# Patient Record
Sex: FEMALE | Race: BLACK OR AFRICAN AMERICAN | URBAN - METROPOLITAN AREA
[De-identification: names, ages, dates, MRNs, and addresses within clinical notes are randomized per-mention and may not be internally consistent; named-entity substitution may affect disease eponyms.]

---

## 2020-06-22 ENCOUNTER — OFFICE VISIT (OUTPATIENT)
Dept: URBAN - METROPOLITAN AREA CLINIC 84 | Facility: CLINIC | Age: 80
End: 2020-06-22

## 2020-09-18 ENCOUNTER — OFFICE VISIT (OUTPATIENT)
Dept: URBAN - METROPOLITAN AREA CLINIC 84 | Facility: CLINIC | Age: 80
End: 2020-09-18
Payer: MEDICARE

## 2020-09-18 DIAGNOSIS — R10.13 DYSPEPSIA: ICD-10-CM

## 2020-09-18 DIAGNOSIS — K59.01 CONSTIPATION: ICD-10-CM

## 2020-09-18 DIAGNOSIS — K64.8 INTERNAL HEMORRHOID: ICD-10-CM

## 2020-09-18 PROCEDURE — G8417 CALC BMI ABV UP PARAM F/U: HCPCS | Performed by: INTERNAL MEDICINE

## 2020-09-18 PROCEDURE — 99213 OFFICE O/P EST LOW 20 MIN: CPT | Performed by: INTERNAL MEDICINE

## 2020-09-18 PROCEDURE — G8427 DOCREV CUR MEDS BY ELIG CLIN: HCPCS | Performed by: INTERNAL MEDICINE

## 2020-09-18 PROCEDURE — G9903 PT SCRN TBCO ID AS NON USER: HCPCS | Performed by: INTERNAL MEDICINE

## 2020-09-18 RX ORDER — OMEPRAZOLE 40 MG/1
TAKE 1 CAPSULE BY ORAL ROUTE DAILY CAPSULE, DELAYED RELEASE PELLETS ORAL 1
Qty: 90 | Refills: 3 | Status: ACTIVE | COMMUNITY
Start: 2019-12-20 | End: 1900-01-01

## 2020-09-18 RX ORDER — ESOMEPRAZOLE MAGNESIUM 40 MG
TAKE 1 CAPSULE BY ORAL ROUTE QD CAPSULE,DELAYED RELEASE (ENTERIC COATED) ORAL 1
Qty: 30 | Refills: 6 | Status: ACTIVE | COMMUNITY
Start: 2014-11-26 | End: 1900-01-01

## 2020-09-18 RX ORDER — LACTULOSE 10 G/15ML
TAKE 30 MILLILITERS BY ORAL ROUTE 4 TIMES A DAY AS NEEDED SOLUTION ORAL
Qty: 1800 | Refills: 5 | Status: ACTIVE | COMMUNITY
Start: 2017-11-20 | End: 1900-01-01

## 2020-09-18 RX ORDER — ASPIRIN 81 MG/1
TABLET, COATED ORAL
Qty: 0 | Refills: 0 | Status: ACTIVE | COMMUNITY
Start: 1900-01-01 | End: 1900-01-01

## 2020-09-18 RX ORDER — HYDROCORTISONE ACETATE 25 MG/1
INSERT 1 SUPPOSITORY BY RECTAL ROUTE 2 TIMES A DAY AS NEEDED SUPPOSITORY RECTAL 2
Qty: 60 | Refills: 5 | Status: ACTIVE | COMMUNITY
Start: 2019-05-21 | End: 1900-01-01

## 2020-09-18 NOTE — HPI-TODAY'S VISIT:
Patient last seen 12/2019.  Overall she is doing well.  SHe has been having a pain between her breasts.  She thinks it is from a HH.  It gets worse when she eats. It occurs intermittently.  She thinks it's from her diet.  She is on Omeprazole daily.  She denies N/V/dysphagia.  She denies anroexia or weight loss.  She thinks might be related to when she does not chew her food well. She denies abdominal pain.  She has regular BM's.  She only uses the Miralax PRN.  She denies strain.  She has intermittent rectal bleeding.  She uses Prep H TX PRN and this improves

## 2020-12-01 ENCOUNTER — TELEPHONE ENCOUNTER (OUTPATIENT)
Dept: URBAN - METROPOLITAN AREA CLINIC 92 | Facility: CLINIC | Age: 80
End: 2020-12-01

## 2020-12-01 RX ORDER — HYDROCORTISONE ACETATE 25 MG/1
INSERT 1 SUPPOSITORY BY RECTAL ROUTE 2 TIMES A DAY AS NEEDED SUPPOSITORY RECTAL 2
Qty: 60
Start: 2019-05-21

## 2021-03-18 ENCOUNTER — OFFICE VISIT (OUTPATIENT)
Dept: URBAN - METROPOLITAN AREA CLINIC 84 | Facility: CLINIC | Age: 81
End: 2021-03-18

## 2021-03-18 RX ORDER — ASPIRIN 81 MG/1
TABLET, COATED ORAL
Qty: 0 | Refills: 0 | COMMUNITY
Start: 1900-01-01

## 2021-03-18 RX ORDER — HYDROCORTISONE ACETATE 25 MG/1
_INSERT 1 SUPPOSITORY BY RECTAL ROUTE 2 TIMES A DAY AS NEEDED SUPPOSITORY RECTAL 2
Qty: 60 | COMMUNITY
Start: 2019-05-21

## 2021-03-18 RX ORDER — LACTULOSE 10 G/15ML
TAKE 30 MILLILITERS BY ORAL ROUTE 4 TIMES A DAY AS NEEDED SOLUTION ORAL
Qty: 1800 | Refills: 5 | COMMUNITY
Start: 2017-11-20

## 2021-03-18 RX ORDER — ESOMEPRAZOLE MAGNESIUM 40 MG
TAKE 1 CAPSULE BY ORAL ROUTE QD CAPSULE,DELAYED RELEASE (ENTERIC COATED) ORAL 1
Qty: 30 | Refills: 6 | COMMUNITY
Start: 2014-11-26

## 2021-03-18 RX ORDER — OMEPRAZOLE 40 MG/1
TAKE 1 CAPSULE BY ORAL ROUTE DAILY CAPSULE, DELAYED RELEASE PELLETS ORAL 1
Qty: 90 | Refills: 3 | COMMUNITY
Start: 2019-12-20

## 2021-04-22 ENCOUNTER — WEB ENCOUNTER (OUTPATIENT)
Dept: URBAN - METROPOLITAN AREA CLINIC 84 | Facility: CLINIC | Age: 81
End: 2021-04-22

## 2021-04-22 ENCOUNTER — OFFICE VISIT (OUTPATIENT)
Dept: URBAN - METROPOLITAN AREA CLINIC 84 | Facility: CLINIC | Age: 81
End: 2021-04-22
Payer: MEDICARE

## 2021-04-22 VITALS
DIASTOLIC BLOOD PRESSURE: 72 MMHG | HEIGHT: 65 IN | SYSTOLIC BLOOD PRESSURE: 146 MMHG | WEIGHT: 293 LBS | TEMPERATURE: 97.5 F | HEART RATE: 69 BPM | BODY MASS INDEX: 48.82 KG/M2

## 2021-04-22 DIAGNOSIS — K64.8 INTERNAL HEMORRHOID: ICD-10-CM

## 2021-04-22 DIAGNOSIS — R10.13 DYSPEPSIA: ICD-10-CM

## 2021-04-22 DIAGNOSIS — K59.01 CONSTIPATION: ICD-10-CM

## 2021-04-22 PROCEDURE — 99213 OFFICE O/P EST LOW 20 MIN: CPT | Performed by: INTERNAL MEDICINE

## 2021-04-22 RX ORDER — HYDROCORTISONE ACETATE 25 MG/1
_INSERT 1 SUPPOSITORY BY RECTAL ROUTE 2 TIMES A DAY AS NEEDED SUPPOSITORY RECTAL 2
Qty: 60 | Status: ACTIVE | COMMUNITY
Start: 2019-05-21

## 2021-04-22 RX ORDER — LACTULOSE 10 G/15ML
TAKE 30 MILLILITERS BY ORAL ROUTE 4 TIMES A DAY AS NEEDED SOLUTION ORAL
Qty: 1800 | Refills: 5 | Status: DISCONTINUED | COMMUNITY
Start: 2017-11-20

## 2021-04-22 RX ORDER — ESOMEPRAZOLE MAGNESIUM 40 MG
TAKE 1 CAPSULE BY ORAL ROUTE QD CAPSULE,DELAYED RELEASE (ENTERIC COATED) ORAL 1
Qty: 30 | Refills: 6 | Status: ACTIVE | COMMUNITY
Start: 2014-11-26

## 2021-04-22 RX ORDER — ASPIRIN 81 MG/1
TABLET, COATED ORAL
Qty: 0 | Refills: 0 | Status: ACTIVE | COMMUNITY
Start: 1900-01-01

## 2021-04-22 RX ORDER — OMEPRAZOLE 40 MG/1
TAKE 1 CAPSULE BY ORAL ROUTE DAILY CAPSULE, DELAYED RELEASE PELLETS ORAL 1
Qty: 90 | Refills: 3 | Status: ACTIVE | COMMUNITY
Start: 2019-12-20

## 2021-04-22 NOTE — PHYSICAL EXAM CONSTITUTIONAL:
Serology (COVID-19) Notification    You have tested NEGATIVE for COVID-19 antibodies  Letter sent to Patient well developed, well nourished , in no acute distress , ambulating without difficulty , normal communication ability

## 2021-11-15 ENCOUNTER — TELEPHONE ENCOUNTER (OUTPATIENT)
Dept: URBAN - METROPOLITAN AREA CLINIC 84 | Facility: CLINIC | Age: 81
End: 2021-11-15

## 2021-11-15 RX ORDER — HYDROCORTISONE ACETATE 25 MG/1
_INSERT 1 SUPPOSITORY BY RECTAL ROUTE 2 TIMES A DAY AS NEEDED SUPPOSITORY RECTAL 2
Qty: 60 | Refills: 3
Start: 2019-05-21 | End: 2022-11-10

## 2021-12-23 ENCOUNTER — WEB ENCOUNTER (OUTPATIENT)
Dept: URBAN - METROPOLITAN AREA CLINIC 84 | Facility: CLINIC | Age: 81
End: 2021-12-23

## 2021-12-23 ENCOUNTER — OFFICE VISIT (OUTPATIENT)
Dept: URBAN - METROPOLITAN AREA CLINIC 84 | Facility: CLINIC | Age: 81
End: 2021-12-23
Payer: MEDICARE

## 2021-12-23 DIAGNOSIS — K59.01 CONSTIPATION: ICD-10-CM

## 2021-12-23 DIAGNOSIS — K64.8 INTERNAL HEMORRHOID: ICD-10-CM

## 2021-12-23 PROCEDURE — 99213 OFFICE O/P EST LOW 20 MIN: CPT | Performed by: INTERNAL MEDICINE

## 2021-12-23 RX ORDER — OMEPRAZOLE 40 MG/1
TAKE 1 CAPSULE BY ORAL ROUTE DAILY CAPSULE, DELAYED RELEASE PELLETS ORAL 1
Qty: 90 | Refills: 3 | Status: ACTIVE | COMMUNITY
Start: 2019-12-20

## 2021-12-23 RX ORDER — ESOMEPRAZOLE MAGNESIUM 40 MG
TAKE 1 CAPSULE BY ORAL ROUTE QD CAPSULE,DELAYED RELEASE (ENTERIC COATED) ORAL 1
Qty: 30 | Refills: 6 | Status: ACTIVE | COMMUNITY
Start: 2014-11-26

## 2021-12-23 RX ORDER — HYDROCORTISONE ACETATE 25 MG/1
_INSERT 1 SUPPOSITORY BY RECTAL ROUTE 2 TIMES A DAY AS NEEDED SUPPOSITORY RECTAL 2
Qty: 60 | Refills: 3 | Status: ACTIVE | COMMUNITY
Start: 2019-05-21 | End: 2022-11-10

## 2021-12-23 RX ORDER — ASPIRIN 81 MG/1
TABLET, COATED ORAL
Qty: 0 | Refills: 0 | Status: ACTIVE | COMMUNITY
Start: 1900-01-01

## 2021-12-23 NOTE — HPI-TODAY'S VISIT:
Patient last seeen 4/2021.  Her hmorrhoids were doing OK for a while but then she had some recurrent symptoms lately.  She has been having intermittent BRBPR.   She has been having a rectal pressure that decreases with a BM.  She uses Miralax about once every 2 weeks.  She has a daily BM.  She often has small pellet stools.  She sameer strain.  She has increased rrectal bleeding.  She denies rectal pain.  She sameer abdominak pain.  She does respond to Anusol Pr PRN. She denies anrroexia or weight loss.  She denies abdominal pain.  She denies UGI symptoms.

## 2022-02-11 ENCOUNTER — OFFICE VISIT (OUTPATIENT)
Dept: URBAN - METROPOLITAN AREA CLINIC 84 | Facility: CLINIC | Age: 82
End: 2022-02-11
Payer: MEDICARE

## 2022-02-11 DIAGNOSIS — K59.01 CONSTIPATION: ICD-10-CM

## 2022-02-11 DIAGNOSIS — K64.8 INTERNAL HEMORRHOID: ICD-10-CM

## 2022-02-11 PROCEDURE — 99213 OFFICE O/P EST LOW 20 MIN: CPT | Performed by: INTERNAL MEDICINE

## 2022-02-11 RX ORDER — HYDROCORTISONE ACETATE 25 MG/1
_INSERT 1 SUPPOSITORY BY RECTAL ROUTE 2 TIMES A DAY AS NEEDED SUPPOSITORY RECTAL 2
Qty: 60 | Refills: 3 | Status: ACTIVE | COMMUNITY
Start: 2019-05-21 | End: 2022-11-10

## 2022-02-11 RX ORDER — ESOMEPRAZOLE MAGNESIUM 40 MG
TAKE 1 CAPSULE BY ORAL ROUTE QD CAPSULE,DELAYED RELEASE (ENTERIC COATED) ORAL 1
Qty: 30 | Refills: 6 | Status: ACTIVE | COMMUNITY
Start: 2014-11-26

## 2022-02-11 RX ORDER — OMEPRAZOLE 40 MG/1
TAKE 1 CAPSULE BY ORAL ROUTE DAILY CAPSULE, DELAYED RELEASE PELLETS ORAL 1
Qty: 90 | Refills: 3 | Status: ACTIVE | COMMUNITY
Start: 2019-12-20

## 2022-02-11 RX ORDER — ASPIRIN 81 MG/1
TABLET, COATED ORAL
Qty: 0 | Refills: 0 | Status: ACTIVE | COMMUNITY
Start: 1900-01-01

## 2022-02-11 NOTE — HPI-TODAY'S VISIT:
Patient last seen 12/2021.  Over the past few weeks she ahs been having recurrent rectal bleedig from her hemorrhoids.  SHe has BRBPR.  She denies rectal pain.  She uses Miralax a few days a week.  She has a daily BM. She denies strain and she empties well.  She denies anroexia or weight loss.  She has occaisonal lower abdominal discomfort.  She denies UGI symptoms.  She is on Omeprazole.  She has been using the Anusol without response

## 2022-04-21 ENCOUNTER — OFFICE VISIT (OUTPATIENT)
Dept: URBAN - METROPOLITAN AREA CLINIC 84 | Facility: CLINIC | Age: 82
End: 2022-04-21

## 2022-07-28 ENCOUNTER — DASHBOARD ENCOUNTERS (OUTPATIENT)
Age: 82
End: 2022-07-28

## 2022-08-11 ENCOUNTER — OFFICE VISIT (OUTPATIENT)
Dept: URBAN - METROPOLITAN AREA CLINIC 84 | Facility: CLINIC | Age: 82
End: 2022-08-11

## 2022-08-11 RX ORDER — ESOMEPRAZOLE MAGNESIUM 40 MG
TAKE 1 CAPSULE BY ORAL ROUTE QD CAPSULE,DELAYED RELEASE (ENTERIC COATED) ORAL 1
Qty: 30 | Refills: 6 | Status: ACTIVE | COMMUNITY
Start: 2014-11-26

## 2022-08-11 RX ORDER — ASPIRIN 81 MG/1
TABLET, COATED ORAL
Qty: 0 | Refills: 0 | Status: ACTIVE | COMMUNITY
Start: 1900-01-01

## 2022-08-11 RX ORDER — HYDROCORTISONE ACETATE 25 MG/1
_INSERT 1 SUPPOSITORY BY RECTAL ROUTE 2 TIMES A DAY AS NEEDED SUPPOSITORY RECTAL 2
Qty: 60 | Refills: 3 | Status: ACTIVE | COMMUNITY
Start: 2019-05-21 | End: 2022-11-10

## 2022-08-11 RX ORDER — OMEPRAZOLE 40 MG/1
TAKE 1 CAPSULE BY ORAL ROUTE DAILY CAPSULE, DELAYED RELEASE PELLETS ORAL 1
Qty: 90 | Refills: 3 | Status: ACTIVE | COMMUNITY
Start: 2019-12-20

## 2024-08-21 ENCOUNTER — TELEPHONE ENCOUNTER (OUTPATIENT)
Dept: URBAN - METROPOLITAN AREA CLINIC 84 | Facility: CLINIC | Age: 84
End: 2024-08-21

## 2024-08-21 RX ORDER — HYDROCORTISONE ACETATE 25 MG/1
_INSERT 1 SUPPOSITORY BY RECTAL ROUTE 2 TIMES A DAY AS NEEDED SUPPOSITORY RECTAL 2
Qty: 30 | Refills: 3
Start: 2019-05-21 | End: 2024-12-24

## 2024-09-05 ENCOUNTER — OFFICE VISIT (OUTPATIENT)
Dept: URBAN - METROPOLITAN AREA CLINIC 84 | Facility: CLINIC | Age: 84
End: 2024-09-05

## 2024-09-05 RX ORDER — AMOXICILLIN 875 MG/1
1 TABLET TABLET, FILM COATED ORAL TWICE A DAY
Status: ACTIVE | COMMUNITY

## 2024-09-05 RX ORDER — OMEPRAZOLE 40 MG/1
TAKE 1 CAPSULE BY ORAL ROUTE DAILY CAPSULE, DELAYED RELEASE PELLETS ORAL 1
Qty: 90 | Refills: 3 | Status: ON HOLD | COMMUNITY
Start: 2019-12-20

## 2024-09-05 RX ORDER — ESOMEPRAZOLE MAGNESIUM 40 MG
TAKE 1 CAPSULE BY ORAL ROUTE QD CAPSULE,DELAYED RELEASE (ENTERIC COATED) ORAL 1
Qty: 30 | Refills: 6 | Status: ON HOLD | COMMUNITY
Start: 2014-11-26

## 2024-09-05 RX ORDER — ASPIRIN 81 MG/1
TABLET, COATED ORAL
Qty: 0 | Refills: 0 | Status: ON HOLD | COMMUNITY
Start: 1900-01-01

## 2024-09-05 RX ORDER — HYDROCORTISONE ACETATE 25 MG/1
_INSERT 1 SUPPOSITORY BY RECTAL ROUTE 2 TIMES A DAY AS NEEDED SUPPOSITORY RECTAL 2
Qty: 30 | Refills: 3 | Status: ON HOLD | COMMUNITY
Start: 2019-05-21 | End: 2024-12-24

## 2024-10-01 ENCOUNTER — OFFICE VISIT (OUTPATIENT)
Dept: URBAN - METROPOLITAN AREA CLINIC 84 | Facility: CLINIC | Age: 84
End: 2024-10-01

## 2024-10-01 RX ORDER — TERBINAFINE HYDROCHLORIDE 1 G/100G
1 APPLICATION CREAM TOPICAL ONCE A DAY
Status: ACTIVE | COMMUNITY

## 2024-10-01 RX ORDER — AMOXICILLIN 875 MG/1
1 TABLET TABLET, FILM COATED ORAL TWICE A DAY
Status: ACTIVE | COMMUNITY

## 2024-11-05 ENCOUNTER — OFFICE VISIT (OUTPATIENT)
Dept: URBAN - METROPOLITAN AREA CLINIC 84 | Facility: CLINIC | Age: 84
End: 2024-11-05
Payer: MEDICARE

## 2024-11-05 VITALS
DIASTOLIC BLOOD PRESSURE: 82 MMHG | SYSTOLIC BLOOD PRESSURE: 173 MMHG | HEIGHT: 65 IN | HEART RATE: 86 BPM | BODY MASS INDEX: 48.82 KG/M2 | WEIGHT: 293 LBS | TEMPERATURE: 97.2 F

## 2024-11-05 DIAGNOSIS — K21.9 GERD: ICD-10-CM

## 2024-11-05 DIAGNOSIS — K59.01 CONSTIPATION: ICD-10-CM

## 2024-11-05 DIAGNOSIS — K64.8 INTERNAL HEMORRHOID: ICD-10-CM

## 2024-11-05 PROCEDURE — 99214 OFFICE O/P EST MOD 30 MIN: CPT | Performed by: INTERNAL MEDICINE

## 2024-11-05 RX ORDER — ASPIRIN 81 MG/1
TABLET, COATED ORAL
Qty: 0 | Refills: 0 | Status: ACTIVE | COMMUNITY
Start: 1900-01-01

## 2024-11-05 RX ORDER — HYDROCORTISONE ACETATE 25 MG/1
_INSERT 1 SUPPOSITORY BY RECTAL ROUTE 2 TIMES A DAY AS NEEDED SUPPOSITORY RECTAL 2
Qty: 30 | Refills: 3 | Status: ACTIVE | COMMUNITY
Start: 2019-05-21 | End: 2024-12-24

## 2024-11-05 RX ORDER — AMOXICILLIN 875 MG/1
1 TABLET TABLET, FILM COATED ORAL TWICE A DAY
Status: ACTIVE | COMMUNITY

## 2024-11-05 RX ORDER — HYDROCORTISONE ACETATE 25 MG/1
_INSERT 1 SUPPOSITORY BY RECTAL ROUTE 2 TIMES A DAY AS NEEDED SUPPOSITORY RECTAL 2
Qty: 30 | Refills: 5
Start: 2019-05-21 | End: 2025-05-04

## 2024-11-05 RX ORDER — TERBINAFINE HYDROCHLORIDE 1 G/100G
1 APPLICATION CREAM TOPICAL ONCE A DAY
Status: ACTIVE | COMMUNITY

## 2024-11-05 RX ORDER — ESOMEPRAZOLE MAGNESIUM 40 MG
TAKE 1 CAPSULE BY ORAL ROUTE QD CAPSULE,DELAYED RELEASE (ENTERIC COATED) ORAL 1
Qty: 30 | Refills: 6 | Status: ACTIVE | COMMUNITY
Start: 2014-11-26

## 2024-11-05 RX ORDER — OMEPRAZOLE 40 MG/1
TAKE 1 CAPSULE BY ORAL ROUTE DAILY CAPSULE, DELAYED RELEASE PELLETS ORAL 1
Qty: 90 | Refills: 3 | Status: ACTIVE | COMMUNITY
Start: 2019-12-20

## 2024-11-05 NOTE — HPI-TODAY'S VISIT:
Patient last seen 2/2022.  There has been no change in her PMHx/PSHx.  She uses the Anusol WA PRN.  She uses MOM PRN.  She was unable to afford the MIralax.  She has occasional blood on the tissue.  She does have occasional flares of her hemorrhoids.  She denies abdominal pain.  She denies anorexia or weight loss.  She denies UGI symptoms.  She takes Omeprazole daily

## 2025-05-05 NOTE — HPI-TODAY'S VISIT:
Patient last seen 9/2020.  Overall she ahs been doing well.  She does have intermittent symptoms from her hemorrhoids.  She has intermittent BRBPR>  She can go more then a month before she has symptoms from the hemorrhoids. IN general she moves her bowels well.  She tries to get fiber in her diet.  The hemorrhoids only give her problems when she ahs a firm/hard stool.  She uses Prep H CO PRN.  She uses MIralax PRN.  She denies abdominal pain.  She denies anorexia or weight loss.  She denies UGI sy,ptoms as long as she takes Omeprazole.  She has bene trying to chew her food better.  She denies dysphagia. 79